# Patient Record
Sex: FEMALE | Race: WHITE | NOT HISPANIC OR LATINO | URBAN - METROPOLITAN AREA
[De-identification: names, ages, dates, MRNs, and addresses within clinical notes are randomized per-mention and may not be internally consistent; named-entity substitution may affect disease eponyms.]

---

## 2019-08-16 ENCOUNTER — INPATIENT (INPATIENT)
Facility: HOSPITAL | Age: 19
LOS: 0 days | Discharge: HOME | DRG: 603 | End: 2019-08-17
Attending: INTERNAL MEDICINE | Admitting: INTERNAL MEDICINE
Payer: MEDICAID

## 2019-08-16 VITALS
TEMPERATURE: 99 F | RESPIRATION RATE: 18 BRPM | HEIGHT: 64 IN | WEIGHT: 154.98 LBS | OXYGEN SATURATION: 98 % | SYSTOLIC BLOOD PRESSURE: 127 MMHG | DIASTOLIC BLOOD PRESSURE: 80 MMHG | HEART RATE: 98 BPM

## 2019-08-16 DIAGNOSIS — F32.9 MAJOR DEPRESSIVE DISORDER, SINGLE EPISODE, UNSPECIFIED: ICD-10-CM

## 2019-08-16 DIAGNOSIS — Z91.89 OTHER SPECIFIED PERSONAL RISK FACTORS, NOT ELSEWHERE CLASSIFIED: ICD-10-CM

## 2019-08-16 DIAGNOSIS — L03.114 CELLULITIS OF LEFT UPPER LIMB: ICD-10-CM

## 2019-08-16 DIAGNOSIS — R63.8 OTHER SYMPTOMS AND SIGNS CONCERNING FOOD AND FLUID INTAKE: ICD-10-CM

## 2019-08-16 LAB
ALBUMIN SERPL ELPH-MCNC: 3.7 G/DL — SIGNIFICANT CHANGE UP (ref 3.3–5)
ALP SERPL-CCNC: 83 U/L — SIGNIFICANT CHANGE UP (ref 40–120)
ALT FLD-CCNC: 10 U/L — SIGNIFICANT CHANGE UP (ref 10–45)
ANION GAP SERPL CALC-SCNC: 10 MMOL/L — SIGNIFICANT CHANGE UP (ref 5–17)
AST SERPL-CCNC: 19 U/L — SIGNIFICANT CHANGE UP (ref 10–40)
BASOPHILS # BLD AUTO: 0.02 K/UL — SIGNIFICANT CHANGE UP (ref 0–0.2)
BASOPHILS NFR BLD AUTO: 0.3 % — SIGNIFICANT CHANGE UP (ref 0–2)
BILIRUB SERPL-MCNC: <0.2 MG/DL — SIGNIFICANT CHANGE UP (ref 0.2–1.2)
BUN SERPL-MCNC: 9 MG/DL — SIGNIFICANT CHANGE UP (ref 7–23)
CALCIUM SERPL-MCNC: 9 MG/DL — SIGNIFICANT CHANGE UP (ref 8.4–10.5)
CHLORIDE SERPL-SCNC: 106 MMOL/L — SIGNIFICANT CHANGE UP (ref 96–108)
CO2 SERPL-SCNC: 25 MMOL/L — SIGNIFICANT CHANGE UP (ref 22–31)
CREAT SERPL-MCNC: 0.69 MG/DL — SIGNIFICANT CHANGE UP (ref 0.5–1.3)
EOSINOPHIL # BLD AUTO: 0.54 K/UL — HIGH (ref 0–0.5)
EOSINOPHIL NFR BLD AUTO: 9.1 % — HIGH (ref 0–6)
GLUCOSE SERPL-MCNC: 114 MG/DL — HIGH (ref 70–99)
HCT VFR BLD CALC: 38.5 % — SIGNIFICANT CHANGE UP (ref 34.5–45)
HGB BLD-MCNC: 12.5 G/DL — SIGNIFICANT CHANGE UP (ref 11.5–15.5)
IMM GRANULOCYTES NFR BLD AUTO: 0.2 % — SIGNIFICANT CHANGE UP (ref 0–1.5)
LYMPHOCYTES # BLD AUTO: 1.33 K/UL — SIGNIFICANT CHANGE UP (ref 1–3.3)
LYMPHOCYTES # BLD AUTO: 22.4 % — SIGNIFICANT CHANGE UP (ref 13–44)
MCHC RBC-ENTMCNC: 28.3 PG — SIGNIFICANT CHANGE UP (ref 27–34)
MCHC RBC-ENTMCNC: 32.5 GM/DL — SIGNIFICANT CHANGE UP (ref 32–36)
MCV RBC AUTO: 87.3 FL — SIGNIFICANT CHANGE UP (ref 80–100)
MONOCYTES # BLD AUTO: 0.43 K/UL — SIGNIFICANT CHANGE UP (ref 0–0.9)
MONOCYTES NFR BLD AUTO: 7.2 % — SIGNIFICANT CHANGE UP (ref 2–14)
NEUTROPHILS # BLD AUTO: 3.61 K/UL — SIGNIFICANT CHANGE UP (ref 1.8–7.4)
NEUTROPHILS NFR BLD AUTO: 60.8 % — SIGNIFICANT CHANGE UP (ref 43–77)
NRBC # BLD: 0 /100 WBCS — SIGNIFICANT CHANGE UP (ref 0–0)
PLATELET # BLD AUTO: 347 K/UL — SIGNIFICANT CHANGE UP (ref 150–400)
POTASSIUM SERPL-MCNC: 4 MMOL/L — SIGNIFICANT CHANGE UP (ref 3.5–5.3)
POTASSIUM SERPL-SCNC: 4 MMOL/L — SIGNIFICANT CHANGE UP (ref 3.5–5.3)
PROT SERPL-MCNC: 7.3 G/DL — SIGNIFICANT CHANGE UP (ref 6–8.3)
RBC # BLD: 4.41 M/UL — SIGNIFICANT CHANGE UP (ref 3.8–5.2)
RBC # FLD: 12.7 % — SIGNIFICANT CHANGE UP (ref 10.3–14.5)
SODIUM SERPL-SCNC: 141 MMOL/L — SIGNIFICANT CHANGE UP (ref 135–145)
WBC # BLD: 5.94 K/UL — SIGNIFICANT CHANGE UP (ref 3.8–10.5)
WBC # FLD AUTO: 5.94 K/UL — SIGNIFICANT CHANGE UP (ref 3.8–10.5)

## 2019-08-16 PROCEDURE — 99222 1ST HOSP IP/OBS MODERATE 55: CPT | Mod: GC

## 2019-08-16 PROCEDURE — 99285 EMERGENCY DEPT VISIT HI MDM: CPT

## 2019-08-16 RX ORDER — ESCITALOPRAM OXALATE 10 MG/1
1 TABLET, FILM COATED ORAL
Qty: 0 | Refills: 0 | DISCHARGE

## 2019-08-16 RX ORDER — DIPHENHYDRAMINE HCL 50 MG
50 CAPSULE ORAL ONCE
Refills: 0 | Status: COMPLETED | OUTPATIENT
Start: 2019-08-16 | End: 2019-08-16

## 2019-08-16 RX ORDER — ACETAMINOPHEN 500 MG
650 TABLET ORAL EVERY 6 HOURS
Refills: 0 | Status: DISCONTINUED | OUTPATIENT
Start: 2019-08-16 | End: 2019-08-17

## 2019-08-16 RX ORDER — ESCITALOPRAM OXALATE 10 MG/1
10 TABLET, FILM COATED ORAL DAILY
Refills: 0 | Status: DISCONTINUED | OUTPATIENT
Start: 2019-08-16 | End: 2019-08-17

## 2019-08-16 RX ORDER — NORGESTIMATE AND ETHINYL ESTRADIOL 7DAYSX3 LO
1 KIT ORAL DAILY
Refills: 0 | Status: DISCONTINUED | OUTPATIENT
Start: 2019-08-16 | End: 2019-08-16

## 2019-08-16 RX ORDER — ESCITALOPRAM OXALATE 10 MG/1
0 TABLET, FILM COATED ORAL
Qty: 0 | Refills: 0 | DISCHARGE

## 2019-08-16 RX ORDER — CEFAZOLIN SODIUM 1 G
1000 VIAL (EA) INJECTION EVERY 8 HOURS
Refills: 0 | Status: DISCONTINUED | OUTPATIENT
Start: 2019-08-17 | End: 2019-08-17

## 2019-08-16 RX ORDER — ACETAMINOPHEN 500 MG
650 TABLET ORAL ONCE
Refills: 0 | Status: COMPLETED | OUTPATIENT
Start: 2019-08-16 | End: 2019-08-16

## 2019-08-16 RX ORDER — NORGESTIMATE AND ETHINYL ESTRADIOL 7DAYSX3 LO
1 KIT ORAL
Qty: 0 | Refills: 0 | DISCHARGE

## 2019-08-16 RX ADMIN — Medication 50 MILLIGRAM(S): at 22:06

## 2019-08-16 RX ADMIN — Medication 650 MILLIGRAM(S): at 22:47

## 2019-08-16 RX ADMIN — Medication 650 MILLIGRAM(S): at 22:06

## 2019-08-16 RX ADMIN — Medication 600 MILLIGRAM(S): at 22:47

## 2019-08-16 RX ADMIN — Medication 100 MILLIGRAM(S): at 22:05

## 2019-08-16 NOTE — H&P ADULT - PROBLEM SELECTOR PLAN 1
no fever, no wbc, no sirs crtieria, no outpt abx tried, but admitted for rapidly developing infection/ streaking up arm, s/p clinda IV in ed  -ceftriaxone IV for strep coverage, dc on cephlex if responds well, low suspicion for MRSA  -ice packs as needed   -tylenol prn for pain no fever, no wbc, no sirs crtieria, no outpt abx tried, but admitted for rapidly developing infection/ streaking up arm, s/p clinda IV in ed  -ceftriaxone IV for strep coverage, dc on cephlex if responds well, low suspicion for MRSA  -ice packs as needed   -tylenol prn for pain  -IV benadryl prn no fever, no wbc, no sirs criteria, no outpt abx tried, admitted for rapidly developing infection, s/p clinda IV in ed x1, no concern for mrsa (no pus, no abscess, no failed outpt abx)  -cefazolin IV for strep coverage, dc on cephalexin if responds well  -ice packs as needed   -tylenol prn for pain

## 2019-08-16 NOTE — ED ADULT NURSE NOTE - OBJECTIVE STATEMENT
Patient w/ redness and swelling X 1 day, tenderness and redness and warmth, itchy and painful, started on hand, now spreading to right arm.  Patient states was in the park when was bitten by an insect.  No fevers, no SOB, no throat/mouth swelling.

## 2019-08-16 NOTE — H&P ADULT - NSHPLABSRESULTS_GEN_ALL_CORE
.  LABS:                         12.5   5.94  )-----------( 347      ( 16 Aug 2019 22:07 )             38.5     08-16    141  |  106  |  9   ----------------------------<  114<H>  4.0   |  25  |  0.69    Ca    9.0      16 Aug 2019 22:07    TPro  7.3  /  Alb  3.7  /  TBili  <0.2  /  DBili  x   /  AST  19  /  ALT  10  /  AlkPhos  83  08-16                  RADIOLOGY, EKG & ADDITIONAL TESTS: Reviewed.

## 2019-08-16 NOTE — H&P ADULT - HISTORY OF PRESENT ILLNESS
18 F no PMH p/w left hand pain and swelling since x 1 day in the setting of bug bite.     States she was sitting in a park at 8:30 PM and was bit by a bug, possibly a mosquito, and had swelling a couple of hours later. This morning the redness was streaking up her arm and the swelling was worse. Associated with pain and itching. Took Motrin 3 hours ago with improvement for pain. States she had welts in the past after being bitten by mosquitoes but no similar reactions. Denies fevers, chills, numbness, fevers.    In the ED, VSS, no WBC, s/p clindamycin IV. 18 F PMH depression p/w left hand pain and swelling since x 1 day in the setting of bug bite. Pt reports she was bit by a bug night prior to admission while in the park. A few hours later she notes swelling in her left hand, and this am noted streaking up her arm to her elbow with worsening swelling. Endorses pain and itching in left arm. Endorses pain improved w/ motrin. Denies fevers, chills, numbness, fevers. States she had welts in the past after being bitten by mosquitoes but no similar reactions.     In the ED, VSS, no WBC, s/p clindamycin IV, benadryl, tylenol. 18 F PMH depression p/w left hand pain and swelling x 1 day in the setting of bug bite. Pt reports she was bit by a bug night prior to admission while in [facility]. A few hours later she noted swelling in her left hand, and this am noted worsening swelling up her arm to her elbow with worsening pain, burning, itching, redness and warmth. Pain improved w/ motrin. Swelling did not improve w/ motrin or clariton. Denies fevers, chills, wheezing/ sob/ n/v/ abd pain. Pt denies hx of skin infection, or prior similar reaction but endorses she usually gets 6mm welts where she is bitten by bugs. Pt visiting her friend, but lives in [facility].    In the ED, VSS, no WBC, s/p clindamycin IV, IV benadryl, and tylenol.

## 2019-08-16 NOTE — ED ADULT NURSE REASSESSMENT NOTE - NS ED NURSE REASSESS COMMENT FT1
Patient a/oX 3, c/o of pain and itching over left hand and arm, redness and swelling noted, warm and tender to touch.  Vital signs stable.  Right AC PIV #20 in place, all labs sent, no complications.  Acetaminophen 650mg PO, Benadryl 50mg PO, Clindamycin IVPB 600mg IVPB completed, no adverse rxn.  For 4 Uris admit.  Stable and comfortable.

## 2019-08-16 NOTE — H&P ADULT - PROBLEM SELECTOR PLAN 4
1) PCP Contacted on Admission: (N) --> Name & Phone #:  2) Date of Contact with PCP: TBD  3) PCP Contacted at Discharge: TBD  4) Summary of Handoff Given to PCP: TBD   5) Post-Discharge Appointment Date: TBD 1) PCP Contacted on Admission: (N) --> Name & Phone #: Dr Miriam Koenig ([facility])  2) Date of Contact with PCP: TBD  3) PCP Contacted at Discharge: TBD  4) Summary of Handoff Given to PCP: TBD   5) Post-Discharge Appointment Date: TBD

## 2019-08-16 NOTE — H&P ADULT - ATTENDING COMMENTS
18 year old female patient with past medical history of depression p/w left hand pain and swelling of 1 day duration happened after a bug bite. Patient is admitted for rapidly developing cellulitis.   1. Cellulitis  IV cephazolin will transition to PO antibiotics for discharge planning after clinical improvement.

## 2019-08-16 NOTE — H&P ADULT - PROBLEM SELECTOR PLAN 2
F: PO hydration  E: replete PRN  N: regular diet  DVT ppx: low risk, pt ambulatory and yound w/o pmh  other ppx: none required  FULL CODE  dispo: RMF  Ambulate as tolerated -c/w lexapro 10    #birth control  -c/w home OCPs -c/w lexapro 10    #birth control  -c/w home OCPs (on placebo week right now so need to give)

## 2019-08-16 NOTE — ED PROVIDER NOTE - ATTENDING CONTRIBUTION TO CARE
17 yo f w no pmhx presents to ED w concern for left hand pain and swelling - noted to be in park yesterday and believes she was bitten by something.  She notes waking up to worsening symptoms today.  No hx of prior reactions.  No fever, chills, paresthesias.  On my face to face ED eval, patient is non toxic in appearance.  + Swelling to left hand along dorsal surface with streaking into dorsal aspect of forearm, distal to elbow.  HRRR, lungs clear.  Abd soft, NT/ND.  Will check labs, given abx and anticipate admission for continued IV abx, etc.

## 2019-08-16 NOTE — H&P ADULT - NSHPSOCIALHISTORY_GEN_ALL_CORE
Lives w/ father near [place], visiting [facility] to see a friend. Student at [employer].

## 2019-08-16 NOTE — H&P ADULT - ASSESSMENT
18 F no PMH admitted for cellulitis after rapid streaking up left arm. 18 F PMH of depression, admitted for cellulitis.

## 2019-08-16 NOTE — ED PROVIDER NOTE - NS ED ROS FT
General: no fever, chills, confusion  Cardiac: no chest pain, chest tightness, palpitations  Lungs: no sob, difficulty breathing  Abdomen: no abdominal pain, nausea, vomiting, diarrhea, constipation, nml BM  : no dysuria, urinary frequency/urgency  Skin: Swelling, redness, pain, itching of R arm.  All other systems negative except as per HPI

## 2019-08-16 NOTE — H&P ADULT - PROBLEM SELECTOR PLAN 3
1) PCP Contacted on Admission: (N) --> Name & Phone #:  2) Date of Contact with PCP: TBD  3) PCP Contacted at Discharge: TBD  4) Summary of Handoff Given to PCP: TBD   5) Post-Discharge Appointment Date: TBD F: PO hydration  E: replete PRN  N: regular diet  DVT ppx: low risk, pt ambulatory and yound w/o pmh  other ppx: none required  FULL CODE  dispo: RMF  Ambulate as tolerated

## 2019-08-16 NOTE — ED PROVIDER NOTE - PHYSICAL EXAMINATION
VITAL SIGNS: I have reviewed nursing notes and confirm.  CONSTITUTIONAL: Well-developed; well-nourished; in no acute distress.  SKIN: Agree with RN documentation regarding decubitus evaluation. Left hand is swollen with redness and warmth around dorsal and volar aspect of hand. Streaking up dorsal forearm up to elbow. Neurovascularly intact.  HEAD: Normocephalic; atraumatic.  EYES: PERRL, EOM intact; conjunctiva and sclera clear.  ENT: No nasal discharge; airway clear.  NECK: Supple; non tender.  CARD: S1, S2 normal; no murmurs, gallops, or rubs. Regular rate and rhythm.  RESP: No wheezes, rales or rhonchi.  ABD: Normal bowel sounds; soft; non-distended; non-tender; no hepatosplenomegaly.  EXT: Normal ROM. No clubbing, cyanosis or edema.  NEURO: Alert, oriented. Grossly unremarkable.  PSYCH: Cooperative, appropriate.

## 2019-08-16 NOTE — ED PROVIDER NOTE - OBJECTIVE STATEMENT
17 y/o right hand dominant F with no PMHx complains of left hand pain and swelling last night. States she was sitting in a park at 8:30 PM and was bit by a bug, possibly a mosquito, and had swelling a couple of hours later. In ED with swelling and redness going up her arm with pain and itching. Took Motrin 3 hours ago with improvement for pain. States she had welts in the past after being bitten by mosquitoes but no similar reactions. Denies fevers, chills, numbness, fevers. 19 y/o right hand dominant F with no PMHx complains of left hand pain and swelling since last night. States she was sitting in a park at 8:30 PM and was bit by a bug, possibly a mosquito, and had swelling a couple of hours later. This morning the redness was streaking up her arm and the swelling was worse. Associated with pain and itching. Took Motrin 3 hours ago with improvement for pain. States she had welts in the past after being bitten by mosquitoes but no similar reactions. Denies fevers, chills, numbness, fevers.

## 2019-08-16 NOTE — ED PROVIDER NOTE - CLINICAL SUMMARY MEDICAL DECISION MAKING FREE TEXT BOX
17 y/o right hand dominant F with no PMHx complains of left hand pain and swelling last night. States she was sitting in a park at 8:30 PM and was bit by a bug, possibly a mosquito, and had swelling a couple of hours later. In ED with swelling and redness going up her arm with pain and itching. Took Motrin 3 hours ago with improvement for pain. States she had welts in the past after being bitten by mosquitoes but no similar reactions. Denies fevers, chills, numbness, fevers.    Area marked with pen, start clindamycin, likely need admit for IV antibiotics to treat cellulitis with streaking.

## 2019-08-16 NOTE — H&P ADULT - NSHPPHYSICALEXAM_GEN_ALL_CORE
.  VITAL SIGNS:  T(C): 37.2 (08-16-19 @ 22:43), Max: 37.2 (08-16-19 @ 21:36)  T(F): 99 (08-16-19 @ 22:43), Max: 99 (08-16-19 @ 22:43)  HR: 78 (08-16-19 @ 22:43) (78 - 98)  BP: 100/59 (08-16-19 @ 22:43) (100/59 - 127/80)  BP(mean): --  RR: 18 (08-16-19 @ 22:43) (18 - 18)  SpO2: 98% (08-16-19 @ 22:43) (98% - 98%)  Wt(kg): --    PHYSICAL EXAM:    Constitutional: WDWN resting comfortably in bed; NAD  Head: NC/AT  Eyes: PERRL, EOMI, clear conjunctiva  ENT: no nasal discharge; uvula midline, no oropharyngeal erythema or exudates; MMM  Neck: supple; no JVD or thyromegaly  Respiratory: CTA B/L; no W/R/R, no retractions  Cardiac: +S1/S2; RRR; no M/R/G;  Gastrointestinal: soft, NT/ND; no rebound or guarding; +BSx4  Extremities: WWP, no clubbing or cyanosis; no peripheral edema  Musculoskeletal: NROM x4; no joint swelling, tenderness or erythema  Vascular: 2+ radial, femoral, DP/PT pulses B/L  Dermatologic: skin warm, dry and intact; no rashes, wounds, or scars  Neurologic: AAOx3; CNII-XII grossly intact; no focal deficits  Psychiatric: affect and characteristics of appearance, verbalizations, behaviors are appropriate .  VITAL SIGNS:  T(C): 37.2 (08-16-19 @ 22:43), Max: 37.2 (08-16-19 @ 21:36)  T(F): 99 (08-16-19 @ 22:43), Max: 99 (08-16-19 @ 22:43)  HR: 78 (08-16-19 @ 22:43) (78 - 98)  BP: 100/59 (08-16-19 @ 22:43) (100/59 - 127/80)  BP(mean): --  RR: 18 (08-16-19 @ 22:43) (18 - 18)  SpO2: 98% (08-16-19 @ 22:43) (98% - 98%)  Wt(kg): --    PHYSICAL EXAM:  Constitutional: WDWN resting comfortably in bed; NAD  Head: NC/AT  Eyes: PERRL, EOMI, clear conjunctiva  ENT: no nasal discharge; uvula midline, no oropharyngeal erythema or exudates; MMM  Respiratory: CTA B/L; no W/R/R, no retractions  Cardiac: +S1/S2; RRR; no M/R/G;  Gastrointestinal: soft, NT/ND; no rebound or guarding; +BSx4  Extremities: LEs nl w/o edema, RUE nl w./o edema, LUE w/ swelling in hand and lower arm w/ warmth and redness (marked by ED w/ marker), non tender  Musculoskeletal: NROM x4; no joint swelling, tenderness or erythema  Neurologic: AAOx3; strength 5/5 in all exts, FROM in LUE fingers, wrist, elbow and shoulder  Psychiatric: affect and characteristics of appearance, verbalizations, behaviors are appropriate

## 2019-08-17 VITALS
SYSTOLIC BLOOD PRESSURE: 110 MMHG | DIASTOLIC BLOOD PRESSURE: 68 MMHG | TEMPERATURE: 98 F | RESPIRATION RATE: 18 BRPM | HEART RATE: 77 BPM | OXYGEN SATURATION: 96 %

## 2019-08-17 LAB
ANION GAP SERPL CALC-SCNC: 9 MMOL/L — SIGNIFICANT CHANGE UP (ref 5–17)
BUN SERPL-MCNC: 10 MG/DL — SIGNIFICANT CHANGE UP (ref 7–23)
CALCIUM SERPL-MCNC: 8.4 MG/DL — SIGNIFICANT CHANGE UP (ref 8.4–10.5)
CHLORIDE SERPL-SCNC: 108 MMOL/L — SIGNIFICANT CHANGE UP (ref 96–108)
CO2 SERPL-SCNC: 24 MMOL/L — SIGNIFICANT CHANGE UP (ref 22–31)
CREAT SERPL-MCNC: 0.61 MG/DL — SIGNIFICANT CHANGE UP (ref 0.5–1.3)
GLUCOSE SERPL-MCNC: 95 MG/DL — SIGNIFICANT CHANGE UP (ref 70–99)
HCT VFR BLD CALC: 34.3 % — LOW (ref 34.5–45)
HGB BLD-MCNC: 11.2 G/DL — LOW (ref 11.5–15.5)
MAGNESIUM SERPL-MCNC: 2.1 MG/DL — SIGNIFICANT CHANGE UP (ref 1.6–2.6)
MCHC RBC-ENTMCNC: 28.8 PG — SIGNIFICANT CHANGE UP (ref 27–34)
MCHC RBC-ENTMCNC: 32.7 GM/DL — SIGNIFICANT CHANGE UP (ref 32–36)
MCV RBC AUTO: 88.2 FL — SIGNIFICANT CHANGE UP (ref 80–100)
NRBC # BLD: 0 /100 WBCS — SIGNIFICANT CHANGE UP (ref 0–0)
PLATELET # BLD AUTO: 304 K/UL — SIGNIFICANT CHANGE UP (ref 150–400)
POTASSIUM SERPL-MCNC: 3.6 MMOL/L — SIGNIFICANT CHANGE UP (ref 3.5–5.3)
POTASSIUM SERPL-SCNC: 3.6 MMOL/L — SIGNIFICANT CHANGE UP (ref 3.5–5.3)
RBC # BLD: 3.89 M/UL — SIGNIFICANT CHANGE UP (ref 3.8–5.2)
RBC # FLD: 12.7 % — SIGNIFICANT CHANGE UP (ref 10.3–14.5)
SODIUM SERPL-SCNC: 141 MMOL/L — SIGNIFICANT CHANGE UP (ref 135–145)
WBC # BLD: 5.22 K/UL — SIGNIFICANT CHANGE UP (ref 3.8–10.5)
WBC # FLD AUTO: 5.22 K/UL — SIGNIFICANT CHANGE UP (ref 3.8–10.5)

## 2019-08-17 PROCEDURE — 99239 HOSP IP/OBS DSCHRG MGMT >30: CPT | Mod: GC

## 2019-08-17 RX ORDER — CEPHALEXIN 500 MG
1 CAPSULE ORAL
Qty: 20 | Refills: 0
Start: 2019-08-17 | End: 2019-08-21

## 2019-08-17 RX ADMIN — ESCITALOPRAM OXALATE 10 MILLIGRAM(S): 10 TABLET, FILM COATED ORAL at 13:13

## 2019-08-17 RX ADMIN — Medication 100 MILLIGRAM(S): at 05:46

## 2019-08-17 RX ADMIN — Medication 100 MILLIGRAM(S): at 13:13

## 2019-08-17 NOTE — DISCHARGE NOTE PROVIDER - HOSPITAL COURSE
Patient is 19 yo F with past medical history of depression    Presented with swollen left hand 2/2 bug bite, found to have cellulitis     Problem List/Main Diagnoses (system-based):     Cellulitis left hand: Patient presented with swollen left hand. Given clinda 600mg x1 in ED then transitioned to cefazolin 1 gram IV for 2x doses. Patient was afebrile and stable.     Inpatient treatment course: As above. Given benadryl, clinda x1 on presentation to ED. Given low suspicion for MRSA, transitioned to cefazolin. Admitted for observation, given 2x doses of cefazolin. Patient was afebrile throughout course and hemodynamically stable.         New medications:     cephalexin 500mg 4 times a day for 5 days.             Labs to be followed outpatient: None.     Exam to be followed outpatient: Resolution of swelling in left hand.

## 2019-08-17 NOTE — PATIENT PROFILE ADULT - STATED REASON FOR ADMISSION
"I remembered gettign a bug bite and then noticed that my hand became swollen and red during the day"

## 2019-08-17 NOTE — DISCHARGE NOTE NURSING/CASE MANAGEMENT/SOCIAL WORK - NSDCDPATPORTLINK_GEN_ALL_CORE
You can access the [facility], offered by [facility], by registering with the following website: [email or web address]

## 2019-08-17 NOTE — DISCHARGE NOTE PROVIDER - NSDCCPCAREPLAN_GEN_ALL_CORE_FT
PRINCIPAL DISCHARGE DIAGNOSIS  Diagnosis: Cellulitis of left upper extremity  Assessment and Plan of Treatment: You presented to the hospital with a swollen left hand. We believe this was from a bacterial infection after a bug bit you. The significant swelling reaction is known as cellulitis and even small bites occasionally can cause this large reaction. Thankfully, your vital signs were normal, and your hand was without severe pain. We gave you some intravenous antibiotics and are discharging you with an oral antibiotic called Cephalexin. Please take one tablet tonight, then take FOUR tablets per day for FIVE days. Please also follow up with your primary care provider to ensure adequate resolution.

## 2019-08-21 DIAGNOSIS — L03.114 CELLULITIS OF LEFT UPPER LIMB: ICD-10-CM

## 2019-08-21 DIAGNOSIS — F32.9 MAJOR DEPRESSIVE DISORDER, SINGLE EPISODE, UNSPECIFIED: ICD-10-CM

## 2019-08-21 DIAGNOSIS — S60.562A INSECT BITE (NONVENOMOUS) OF LEFT HAND, INITIAL ENCOUNTER: ICD-10-CM
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
## 2019-10-31 PROCEDURE — 80053 COMPREHEN METABOLIC PANEL: CPT

## 2019-10-31 PROCEDURE — 85027 COMPLETE CBC AUTOMATED: CPT

## 2019-10-31 PROCEDURE — 99285 EMERGENCY DEPT VISIT HI MDM: CPT | Mod: 25

## 2019-10-31 PROCEDURE — 96374 THER/PROPH/DIAG INJ IV PUSH: CPT

## 2019-10-31 PROCEDURE — 36415 COLL VENOUS BLD VENIPUNCTURE: CPT

## 2019-10-31 PROCEDURE — 85025 COMPLETE CBC W/AUTO DIFF WBC: CPT

## 2019-10-31 PROCEDURE — 83735 ASSAY OF MAGNESIUM: CPT

## 2019-10-31 PROCEDURE — 80048 BASIC METABOLIC PNL TOTAL CA: CPT

## 2021-01-08 NOTE — ED ADULT TRIAGE NOTE - HEIGHT IN FEET
amLODIPine 5 mg oral tablet: 1 tab(s) orally once a day AM  atorvastatin 20 mg oral tablet: 1 tab(s) orally once a day  clopidogrel 75 mg oral tablet: 1 tab(s) orally once a day last dose 12/21/20  Oyster Shell 500 (1250 mg calcium carbonate) oral tablet: 1 tab(s) orally once a day lasttaked 12/21/20  Synthroid 100 mcg (0.1 mg) oral tablet: 1 tab(s) orally once a day AM (SATURDAY AND SUNDAYS)  Synthroid 112 mcg (0.112 mg) oral tablet: 1 tab(s) orally once a day AM  (MONDAY THROUGH FRIDAY ONLY)  Vitamin D2 50,000 intl units (1.25 mg) oral capsule: 1 cap(s) orally once a week Mondays   5 amLODIPine 5 mg oral tablet: 1 tab(s) orally once a day AM  atorvastatin 20 mg oral tablet: 1 tab(s) orally once a day  clopidogrel 75 mg oral tablet: 1 tab(s) orally once a day last dose 12/21/20  jevity: 330 milliliter(s) by gastrostomy tube every 6 hours   Oyster Shell 500 (1250 mg calcium carbonate) oral tablet: 1 tab(s) orally once a day lasttaked 12/21/20  Synthroid 100 mcg (0.1 mg) oral tablet: 1 tab(s) orally once a day AM (SATURDAY AND SUNDAYS)  Synthroid 112 mcg (0.112 mg) oral tablet: 1 tab(s) orally once a day AM  (MONDAY THROUGH FRIDAY ONLY)  Vitamin D2 50,000 intl units (1.25 mg) oral capsule: 1 cap(s) orally once a week Mondays   60 cc syringe : application by gastrostomy tube once a day   amLODIPine 5 mg oral tablet: 1 tab(s) orally once a day AM  atorvastatin 20 mg oral tablet: 1 tab(s) orally once a day  clopidogrel 75 mg oral tablet: 1 tab(s) orally once a day last dose 12/21/20  jevity: 330 milliliter(s) by gastrostomy tube every 6 hours   Oyster Shell 500 (1250 mg calcium carbonate) oral tablet: 1 tab(s) orally once a day lasttaked 12/21/20  Synthroid 100 mcg (0.1 mg) oral tablet: 1 tab(s) orally once a day AM (SATURDAY AND SUNDAYS)  Synthroid 112 mcg (0.112 mg) oral tablet: 1 tab(s) orally once a day AM  (MONDAY THROUGH FRIDAY ONLY)  Vitamin D2 50,000 intl units (1.25 mg) oral capsule: 1 cap(s) orally once a week Mondays   60 cc syringe : application by gastrostomy tube once a day   amLODIPine 5 mg oral tablet: 1 tab(s) by gastrostomy tube once a day  atorvastatin 20 mg oral tablet: 1 tab(s) by gastrostomy tube once a day  jevity: 330 milliliter(s) by gastrostomy tube every 6 hours   Oyster Shell 500 (1250 mg calcium carbonate) oral tablet: 1 tab(s) by gastrostomy tube once a day  Percocet 5 mg-325 mg oral tablet: 1 tab(s) by gastrostomy tube every 6 hours MDD:4  petrolatum topical ointment: 1 application topically 3 times a day  senna 8.8 mg/5 mL oral syrup: 10 milliliter(s) orally once a day (at bedtime)  Synthroid 100 mcg (0.1 mg) oral tablet: 1 tab(s) by gastrostomy tube once a day  Synthroid 112 mcg (0.112 mg) oral tablet: 1 tab(s) by gastrostomy tube once a day  Vitamin D2 50,000 intl units (1.25 mg) oral capsule: 1 cap(s) by gastrostomy tube once a week   60 cc syringe : application by gastrostomy tube once a day   amLODIPine 5 mg oral tablet: 1 tab(s) by gastrostomy tube once a day  atorvastatin 20 mg oral tablet: 1 tab(s) by gastrostomy tube once a day  jevity: 330 milliliter(s) by gastrostomy tube every 6 hours   Oyster Shell 500 (1250 mg calcium carbonate) oral tablet: 1 tab(s) by gastrostomy tube once a day  Pepcid 20 mg oral tablet: 1 tab(s) by gastrostomy tube once a day   Percocet 5 mg-325 mg oral tablet: 1 tab(s) by gastrostomy tube every 6 hours MDD:4  petrolatum topical ointment: 1 application topically 3 times a day  senna 8.8 mg/5 mL oral syrup: 10 milliliter(s) orally once a day (at bedtime)  Synthroid 100 mcg (0.1 mg) oral tablet: 1 tab(s) by gastrostomy tube once a day  Synthroid 112 mcg (0.112 mg) oral tablet: 1 tab(s) by gastrostomy tube once a day  Vitamin D2 50,000 intl units (1.25 mg) oral capsule: 1 cap(s) by gastrostomy tube once a week
